# Patient Record
(demographics unavailable — no encounter records)

---

## 2025-07-01 NOTE — HISTORY OF PRESENT ILLNESS
[FreeTextEntry1] : 5 yrs doing well no concerns diet ok sleeps well bowels good behavior ok  in the fall did well in pre-K

## 2025-07-01 NOTE — PHYSICAL EXAM
